# Patient Record
Sex: MALE | Race: WHITE | NOT HISPANIC OR LATINO | Employment: FULL TIME | URBAN - METROPOLITAN AREA
[De-identification: names, ages, dates, MRNs, and addresses within clinical notes are randomized per-mention and may not be internally consistent; named-entity substitution may affect disease eponyms.]

---

## 2020-05-18 ENCOUNTER — HOSPITAL ENCOUNTER (OUTPATIENT)
Dept: PREADMISSION TESTING | Facility: HOSPITAL | Age: 55
Discharge: HOME | End: 2020-05-18
Attending: SURGERY
Payer: COMMERCIAL

## 2020-05-18 ENCOUNTER — LAB VISIT (OUTPATIENT)
Dept: LAB | Facility: HOSPITAL | Age: 55
End: 2020-05-18
Attending: SURGERY
Payer: COMMERCIAL

## 2020-05-18 DIAGNOSIS — K42.9 UMBILICAL HERNIA WITHOUT OBSTRUCTION AND WITHOUT GANGRENE: ICD-10-CM

## 2020-05-18 LAB
ANION GAP SERPL CALC-SCNC: 12 MMOL/L (ref 8–16)
BASOPHILS # BLD AUTO: 0.07 K/UL (ref 0–0.2)
BASOPHILS NFR BLD: 0.5 % (ref 0–1.9)
BUN SERPL-MCNC: 16 MG/DL (ref 6–20)
CALCIUM SERPL-MCNC: 9.5 MG/DL (ref 8.7–10.5)
CHLORIDE SERPL-SCNC: 105 MMOL/L (ref 95–110)
CO2 SERPL-SCNC: 25 MMOL/L (ref 23–29)
CREAT SERPL-MCNC: 0.9 MG/DL (ref 0.5–1.4)
DIFFERENTIAL METHOD: ABNORMAL
EOSINOPHIL # BLD AUTO: 0.2 K/UL (ref 0–0.5)
EOSINOPHIL NFR BLD: 1.5 % (ref 0–8)
ERYTHROCYTE [DISTWIDTH] IN BLOOD BY AUTOMATED COUNT: 14.1 % (ref 11.5–14.5)
EST. GFR  (AFRICAN AMERICAN): >60 ML/MIN/1.73 M^2
EST. GFR  (NON AFRICAN AMERICAN): >60 ML/MIN/1.73 M^2
GLUCOSE SERPL-MCNC: 166 MG/DL (ref 70–110)
HCT VFR BLD AUTO: 43.9 % (ref 40–54)
HGB BLD-MCNC: 14.2 G/DL (ref 14–18)
IMM GRANULOCYTES # BLD AUTO: 0.04 K/UL (ref 0–0.04)
IMM GRANULOCYTES NFR BLD AUTO: 0.3 % (ref 0–0.5)
LYMPHOCYTES # BLD AUTO: 2.3 K/UL (ref 1–4.8)
LYMPHOCYTES NFR BLD: 17.6 % (ref 18–48)
MCH RBC QN AUTO: 28.7 PG (ref 27–31)
MCHC RBC AUTO-ENTMCNC: 32.3 G/DL (ref 32–36)
MCV RBC AUTO: 89 FL (ref 82–98)
MONOCYTES # BLD AUTO: 1.1 K/UL (ref 0.3–1)
MONOCYTES NFR BLD: 8.1 % (ref 4–15)
NEUTROPHILS # BLD AUTO: 9.4 K/UL (ref 1.8–7.7)
NEUTROPHILS NFR BLD: 72 % (ref 38–73)
NRBC BLD-RTO: 0 /100 WBC
PLATELET # BLD AUTO: 373 K/UL (ref 150–350)
PMV BLD AUTO: 10.2 FL (ref 9.2–12.9)
POTASSIUM SERPL-SCNC: 3.8 MMOL/L (ref 3.5–5.1)
RBC # BLD AUTO: 4.94 M/UL (ref 4.6–6.2)
SODIUM SERPL-SCNC: 142 MMOL/L (ref 136–145)
WBC # BLD AUTO: 13.07 K/UL (ref 3.9–12.7)

## 2020-05-18 PROCEDURE — 80048 BASIC METABOLIC PNL TOTAL CA: CPT

## 2020-05-18 PROCEDURE — 85025 COMPLETE CBC W/AUTO DIFF WBC: CPT

## 2020-05-18 PROCEDURE — 36415 COLL VENOUS BLD VENIPUNCTURE: CPT

## 2020-05-18 RX ORDER — AMLODIPINE BESYLATE 10 MG/1
10 TABLET ORAL DAILY
COMMUNITY

## 2020-05-18 RX ORDER — NAPROXEN SODIUM 220 MG/1
81 TABLET, FILM COATED ORAL DAILY
COMMUNITY

## 2020-05-18 RX ORDER — LISINOPRIL 40 MG/1
40 TABLET ORAL DAILY
COMMUNITY

## 2020-05-18 RX ORDER — METFORMIN HYDROCHLORIDE 500 MG/1
500 TABLET ORAL 2 TIMES DAILY WITH MEALS
COMMUNITY

## 2020-05-18 RX ORDER — ATORVASTATIN CALCIUM 40 MG/1
40 TABLET, FILM COATED ORAL DAILY
COMMUNITY

## 2020-05-18 NOTE — DISCHARGE INSTRUCTIONS
[facility]  Pre Admit Instructions    Day and Date of Procedure: Wednesday 6/3/20  Arrival time: 9am      · Call your doctor if you become ill before your surgery  · Someone will call you between 1 p.m. And 5 p.m.the workday before the procedure to give you an arrival time       - 7 a.m. To 5 p.m. Enter through Patient Registration Main Lobby  · You must have a responsible  to bring you home    Do NOT eat anything past: midnight  Ok for clear liquids until: 6am  Examples of clear liquids are: water, juice, or gatorade - NO Dairy     Please    · Do not wear makeup, jewelry, nail polish or body piercings  · Bring containers/solution for contacts, dentures, bridges - these and hearing aids will be removed before your procedure  · Do not bring cash, jewelry or valuables the day of your procedure   · No smoking at least 24 hours before your procedure  · Wear clothing that is comfortable and easy to take off and put on  · Do NOT shave for at least 5 days before your surgery    Review skin preparation handout before using. Shower with Hibiclens the Night before and morning of the procedure.                 Information about your stay (Please Review)    Before Surgery  1. Cafeteria Meals: 7am to 10am; 11am to 1:30 pm; Dinner/Supper must may be ordered between 11:00 am and 4 pm from the [facility] [employer]. Food will be available to  between 5 pm and 6 pm. The kitchen phone extension is [phone].  2. Your doctor may order and review labs, x-rays, ECG or other tests as a pre-surgery workup and will call you if there is need for follow up.  3. No smoking inside or outside the hospital on hospital grounds.  4. Wear clothing that is easy to take off and put on.  The hospital will provide you with a gown.  5. You may bring robe, slippers, nightwear, and toiletries (toothbrush, toothpaste, makeup).  6. If your doctor orders a Fleets Enema or other prep, follow package and/or doctors  orders.  7. Brush your teeth and rinse your mouth the morning of surgery, but dont swallow the water.  8. The nurse will ask questions and check your condition.  The doctor may tiarra your surgical site.  9. Compression boots may be put on your calves to reduce the risk of blood clots.  10. The doctor may order medicine to help you relax before surgery.  After Surgery  1. The nurse will check your temperature, breathing, blood pressure, heart rate, IV site, and surgery site.  2. A diet will be ordered-most start with ice chips and then advance slowly to other foods.  3. If you have IV fluids the IV pump will beep to let the nurse know that she needs to check it.  4. You may have a urinary catheter and staff may measure your oral intake and urine output.  5. Pain medication may be ordered by the doctor after surgery.  If you have a pain management device tell your caretakers not to press the button because of OVERDOSE RISK.  6. When the nurse or doctor tells you it is okay to get out of bed, ask for help until you are stable.  7. The nurse may ask you to turn, cough, and deep breathe to prevent lung problems.  You can use a pillow to hold your incision when you deep breathe or cough to reduce pain.  8. The nurse will give you discharge instructions--incision care, symptoms to report to your doctor, and your follow-up appointment when you are discharged.  You cannot drive yourself home.  Goal for Discharge from One Day Surgery  · Control pain with an oral medication  · Walk without feeling dizzy or weak  · Tolerate liquids well  · Urinate without difficulty    Things you can do to  Reduce the Risk of Infections or Complications  Wash Hands and use Waterless Hand Sanitizers  · Wash hands frequently with soap and warm water for at least 15 seconds.   · Use hand sanitizers (alcohol based) often at home and in public if hands are not visibly soiled  Take Antibiotic Exactly as Prescribed  · Do not stop antibiotics too soon;  you risk developing infection resistant to antibiotics  · Take your antibiotic even if you are feeling better and even if they upset your stomach  · Call the doctor if you cant tolerate the antibiotic or you have an allergic reaction  Stay Healthy  · Take medicines as prescribed by your doctor  · Keep your diabetes under control - diet and medication  · Get enough rest, exercise and eat a healthy diet  Keep the Wound Clean and Dry  · Wash hands before and after taking care of the incision (cut)  · Wash hands when you remove a dressing, before you touch/apply a new dressing  · Shower and clean incision with antibacterial soap and rinse well if the doctor approves  · Allow the cut to dry completely before putting on a clean dressing  · Do not touch the part of the bandage that will cover the incision  · Do not use ointments unless your doctor tells you to-can promote bacterial growth  · If ordered, put ointment directly on the dressing-do not touch the end of the tube  · Do not scrub, remove scabs, or leave a damp dressing on the incision  · Do not use peroxide or alcohol to clean the incision unless the doctor tells you to   · Do not let children, pets or anyone else contaminate the incision  Stop Smoking To Prevent Infection  · Stop smoking-Centers for Disease Control recommends 30 days before surgery  · Smokers get more infections after surgery-studies have shown 6 times the risk  · Smokers have more scarring and heal slower-open wounds get infected easier  Prevent Respiratory complications  · Stop smoking  · Turn, cough, and deep breathe even if you have some pain when you do so.  · Splint your incision with a pillow when you cough/deep breath, to help control pain.  · Do not lie in one position for long periods of time.   Prevent Blood Clots  · When you wake move your legs, flex your feet, rotate your ankles, wiggle your toes  · Get up when the doctor says its ok.  Dangle your feet from the side of the  bed  · Report symptoms-leg pain, redness/swelling, warm to touch; fever; shortness of breath, chest pain, severe upper back pain.

## 2020-06-03 ENCOUNTER — ANESTHESIA (OUTPATIENT)
Dept: SURGERY | Facility: HOSPITAL | Age: 55
End: 2020-06-03
Payer: COMMERCIAL

## 2020-06-03 ENCOUNTER — ANESTHESIA EVENT (OUTPATIENT)
Dept: SURGERY | Facility: HOSPITAL | Age: 55
End: 2020-06-03
Payer: COMMERCIAL

## 2020-06-03 ENCOUNTER — HOSPITAL ENCOUNTER (OUTPATIENT)
Facility: HOSPITAL | Age: 55
Discharge: HOME | End: 2020-06-03
Attending: SURGERY | Admitting: SURGERY
Payer: COMMERCIAL

## 2020-06-03 VITALS
HEART RATE: 84 BPM | DIASTOLIC BLOOD PRESSURE: 59 MMHG | RESPIRATION RATE: 18 BRPM | TEMPERATURE: 96 F | SYSTOLIC BLOOD PRESSURE: 121 MMHG | OXYGEN SATURATION: 93 %

## 2020-06-03 DIAGNOSIS — K42.9 UMBILICAL HERNIA: Primary | ICD-10-CM

## 2020-06-03 LAB — SARS-COV-2 RDRP RESP QL NAA+PROBE: NEGATIVE

## 2020-06-03 PROCEDURE — 36000707: Performed by: SURGERY

## 2020-06-03 PROCEDURE — U0002 COVID-19 LAB TEST NON-CDC: HCPCS

## 2020-06-03 PROCEDURE — 00830 ANES HERNIA RPR LWR ABD NOS: CPT | Mod: QZ | Performed by: NURSE ANESTHETIST, CERTIFIED REGISTERED

## 2020-06-03 PROCEDURE — 25000003 PHARM REV CODE 250

## 2020-06-03 PROCEDURE — 63600175 PHARM REV CODE 636 W HCPCS

## 2020-06-03 PROCEDURE — 25000003 PHARM REV CODE 250: Performed by: SURGERY

## 2020-06-03 PROCEDURE — C1781 MESH (IMPLANTABLE): HCPCS | Performed by: SURGERY

## 2020-06-03 PROCEDURE — 36000706: Performed by: SURGERY

## 2020-06-03 PROCEDURE — 37000009 HC ANESTHESIA EA ADD 15 MINS: Performed by: SURGERY

## 2020-06-03 PROCEDURE — 94760 N-INVAS EAR/PLS OXIMETRY 1: CPT

## 2020-06-03 PROCEDURE — 37000008 HC ANESTHESIA 1ST 15 MINUTES: Performed by: SURGERY

## 2020-06-03 PROCEDURE — 71000033 HC RECOVERY, INTIAL HOUR: Performed by: SURGERY

## 2020-06-03 DEVICE — MESH PATCH HERNIA VENTRALEX: Type: IMPLANTABLE DEVICE | Site: UMBILICAL | Status: FUNCTIONAL

## 2020-06-03 RX ORDER — ACETAMINOPHEN 10 MG/ML
INJECTION, SOLUTION INTRAVENOUS
Status: DISCONTINUED | OUTPATIENT
Start: 2020-06-03 | End: 2020-06-03

## 2020-06-03 RX ORDER — FENTANYL CITRATE 50 UG/ML
INJECTION, SOLUTION INTRAMUSCULAR; INTRAVENOUS
Status: DISCONTINUED | OUTPATIENT
Start: 2020-06-03 | End: 2020-06-03

## 2020-06-03 RX ORDER — CEFAZOLIN SODIUM 2 G/50ML
2 SOLUTION INTRAVENOUS
Status: DISCONTINUED | OUTPATIENT
Start: 2020-06-03 | End: 2020-06-03 | Stop reason: HOSPADM

## 2020-06-03 RX ORDER — HYDROMORPHONE HYDROCHLORIDE 1 MG/ML
1 INJECTION, SOLUTION INTRAMUSCULAR; INTRAVENOUS; SUBCUTANEOUS EVERY 4 HOURS PRN
Status: DISCONTINUED | OUTPATIENT
Start: 2020-06-03 | End: 2020-06-03 | Stop reason: HOSPADM

## 2020-06-03 RX ORDER — ONDANSETRON HYDROCHLORIDE 2 MG/ML
INJECTION, SOLUTION INTRAMUSCULAR; INTRAVENOUS
Status: DISCONTINUED | OUTPATIENT
Start: 2020-06-03 | End: 2020-06-03

## 2020-06-03 RX ORDER — SODIUM CHLORIDE 9 MG/ML
INJECTION, SOLUTION INTRAVENOUS CONTINUOUS
Status: DISCONTINUED | OUTPATIENT
Start: 2020-06-03 | End: 2020-06-03 | Stop reason: HOSPADM

## 2020-06-03 RX ORDER — ONDANSETRON 2 MG/ML
4 INJECTION INTRAMUSCULAR; INTRAVENOUS EVERY 12 HOURS PRN
Status: DISCONTINUED | OUTPATIENT
Start: 2020-06-03 | End: 2020-06-03 | Stop reason: HOSPADM

## 2020-06-03 RX ORDER — SODIUM CHLORIDE, SODIUM LACTATE, POTASSIUM CHLORIDE, CALCIUM CHLORIDE 600; 310; 30; 20 MG/100ML; MG/100ML; MG/100ML; MG/100ML
INJECTION, SOLUTION INTRAVENOUS CONTINUOUS PRN
Status: DISCONTINUED | OUTPATIENT
Start: 2020-06-03 | End: 2020-06-03

## 2020-06-03 RX ORDER — MIDAZOLAM HYDROCHLORIDE 1 MG/ML
INJECTION INTRAMUSCULAR; INTRAVENOUS
Status: DISCONTINUED | OUTPATIENT
Start: 2020-06-03 | End: 2020-06-03

## 2020-06-03 RX ORDER — LIDOCAINE HYDROCHLORIDE 20 MG/ML
INJECTION, SOLUTION EPIDURAL; INFILTRATION; INTRACAUDAL; PERINEURAL
Status: DISCONTINUED | OUTPATIENT
Start: 2020-06-03 | End: 2020-06-03

## 2020-06-03 RX ORDER — BUPIVACAINE HYDROCHLORIDE AND EPINEPHRINE 5; 5 MG/ML; UG/ML
INJECTION, SOLUTION EPIDURAL; INTRACAUDAL; PERINEURAL
Status: DISCONTINUED | OUTPATIENT
Start: 2020-06-03 | End: 2020-06-03 | Stop reason: HOSPADM

## 2020-06-03 RX ORDER — CEFAZOLIN SODIUM 1 G/3ML
INJECTION, POWDER, FOR SOLUTION INTRAMUSCULAR; INTRAVENOUS
Status: DISCONTINUED | OUTPATIENT
Start: 2020-06-03 | End: 2020-06-03

## 2020-06-03 RX ORDER — HYDROCODONE BITARTRATE AND ACETAMINOPHEN 5; 325 MG/1; MG/1
1 TABLET ORAL EVERY 4 HOURS PRN
Status: DISCONTINUED | OUTPATIENT
Start: 2020-06-03 | End: 2020-06-03 | Stop reason: HOSPADM

## 2020-06-03 RX ORDER — PROPOFOL 10 MG/ML
VIAL (ML) INTRAVENOUS
Status: DISCONTINUED | OUTPATIENT
Start: 2020-06-03 | End: 2020-06-03

## 2020-06-03 RX ADMIN — LIDOCAINE HYDROCHLORIDE 80 MG: 20 INJECTION, SOLUTION EPIDURAL; INFILTRATION; INTRACAUDAL; PERINEURAL at 08:06

## 2020-06-03 RX ADMIN — FENTANYL CITRATE 25 MCG: 50 INJECTION, SOLUTION INTRAMUSCULAR; INTRAVENOUS at 08:06

## 2020-06-03 RX ADMIN — FENTANYL CITRATE 50 MCG: 50 INJECTION, SOLUTION INTRAMUSCULAR; INTRAVENOUS at 09:06

## 2020-06-03 RX ADMIN — ONDANSETRON 8 MG: 2 INJECTION, SOLUTION INTRAMUSCULAR; INTRAVENOUS at 08:06

## 2020-06-03 RX ADMIN — SODIUM CHLORIDE, SODIUM LACTATE, POTASSIUM CHLORIDE, AND CALCIUM CHLORIDE: .6; .31; .03; .02 INJECTION, SOLUTION INTRAVENOUS at 08:06

## 2020-06-03 RX ADMIN — PROPOFOL 200 MG: 10 INJECTION, EMULSION INTRAVENOUS at 08:06

## 2020-06-03 RX ADMIN — CEFAZOLIN 2 G: 1 INJECTION, POWDER, FOR SOLUTION INTRAVENOUS at 08:06

## 2020-06-03 RX ADMIN — MIDAZOLAM HYDROCHLORIDE 2 MG: 1 INJECTION, SOLUTION INTRAMUSCULAR; INTRAVENOUS at 08:06

## 2020-06-03 RX ADMIN — FENTANYL CITRATE 50 MCG: 50 INJECTION, SOLUTION INTRAMUSCULAR; INTRAVENOUS at 08:06

## 2020-06-03 RX ADMIN — ACETAMINOPHEN 1000 MG: 10 INJECTION, SOLUTION INTRAVENOUS at 08:06

## 2020-06-03 NOTE — TRANSFER OF CARE
Anesthesia Transfer of Care Note    Patient: Rajinder Jett    Procedure(s) Performed: Procedure(s) (LRB):  REPAIR, HERNIA, UMBILICAL (N/A)    Patient location: PACU    Anesthesia Type: general    Transport from OR: Transported from OR on 6-10 L/min O2 by face mask with adequate spontaneous ventilation    Post pain: adequate analgesia    Post assessment: no apparent anesthetic complications and tolerated procedure well    Post vital signs: stable    Level of consciousness: sedated    Nausea/Vomiting: no nausea/vomiting    Complications: none    Transfer of care protocol was followed      Last vitals:   Visit Vitals  /66 (BP Location: Left arm, Patient Position: Lying)   Pulse 83   Temp 35.7 °C (96.3 °F) (Oral)   Resp 18   SpO2 (!) 93%

## 2020-06-03 NOTE — ANESTHESIA POSTPROCEDURE EVALUATION
Anesthesia Post Evaluation    Patient: Rajinder Jett    Procedure(s) Performed: Procedure(s) (LRB):  REPAIR, HERNIA, UMBILICAL (N/A)    Final Anesthesia Type: general    Patient location during evaluation: PACU  Patient participation: Yes- Able to Participate  Level of consciousness: awake and alert and oriented  Post-procedure vital signs: reviewed and stable  Pain management: adequate  Airway patency: patent    PONV status at discharge: No PONV  Anesthetic complications: no      Cardiovascular status: blood pressure returned to baseline and hemodynamically stable  Respiratory status: unassisted, spontaneous ventilation and room air  Hydration status: euvolemic  Follow-up not needed.          Vitals Value Taken Time   /66 6/3/2020 10:15 AM   Temp 35.7 °C (96.3 °F) 6/3/2020 10:00 AM   Pulse 83 6/3/2020 10:15 AM   Resp 18 6/3/2020 10:00 AM   SpO2 93 % 6/3/2020 11:10 AM         Event Time     Out of Recovery 09:46:25          Pain/Karishma Score: Karishma Score: 7 (6/3/2020  9:08 AM)

## 2020-06-03 NOTE — ANESTHESIA PREPROCEDURE EVALUATION
06/03/2020  Rajinder Jett is a 55 y.o., male.    Anesthesia Evaluation    I have reviewed the Patient Summary Reports.    I have reviewed the Nursing Notes.    I have reviewed the Medications.     Review of Systems  Anesthesia Hx:  No problems with previous Anesthesia    Social:  Non-Smoker, No Alcohol Use    Hematology/Oncology:  Hematology Normal   Oncology Normal     EENT/Dental:EENT/Dental Normal   Cardiovascular:   Exercise tolerance: good Hypertension, well controlled    Pulmonary:  Pulmonary Normal    Renal/:  Renal/ Normal     Hepatic/GI:  Hepatic/GI Normal    Musculoskeletal:  Musculoskeletal Normal    Neurological:  Neurology Normal    Endocrine:   Diabetes, well controlled, type 2    Dermatological:  Skin Normal    Psych:  Psychiatric Normal           Physical Exam  General:  Obesity    Airway/Jaw/Neck:  Airway Findings: Mouth Opening: Normal Tongue: Normal  General Airway Assessment: Adult  Mallampati: II  TM Distance: Normal, at least 6 cm                 Anesthesia Plan  Type of Anesthesia, risks & benefits discussed:  Anesthesia Type:  general  Patient's Preference:   Intra-op Monitoring Plan: standard ASA monitors  Intra-op Monitoring Plan Comments:   Post Op Pain Control Plan: per primary service following discharge from PACU and multimodal analgesia  Post Op Pain Control Plan Comments:   Induction:   IV  Beta Blocker:  Patient is not currently on a Beta-Blocker (No further documentation required).       Informed Consent: Patient understands risks and agrees with Anesthesia plan.  Questions answered. Anesthesia consent signed with patient.  ASA Score: 2     Day of Surgery Review of History & Physical: I have interviewed and examined the patient. I have reviewed the patient's H&P dated: 6/3/2020. There are no significant changes.  H&P update referred to the surgeon.         Ready For  Surgery From Anesthesia Perspective.

## 2020-06-03 NOTE — BRIEF OP NOTE
[facility]  Brief Operative Note    Surgery Date: 6/3/2020     Surgeon(s) and Role:     * Will Acuña MD - Primary    Assisting Surgeon: None    Pre-op Diagnosis:  Umbilical hernia without obstruction and without gangrene [K42.9]    Post-op Diagnosis:  Post-Op Diagnosis Codes:     * Umbilical hernia without obstruction and without gangrene [K42.9]    Procedure(s) (LRB):  REPAIR, HERNIA, UMBILICAL (N/A)    Anesthesia: General    Description of the findings of the procedure(s):3cm defect    Estimated Blood Loss:10cc         Specimens:   Specimen (12h ago, onward)    None            Discharge Note    OUTCOME: Patient tolerated treatment/procedure well without complication and is now ready for discharge.    DISPOSITION: Home or Self Care    FINAL DIAGNOSIS:  Umbilical hernia    FOLLOWUP: In clinic    DISCHARGE INSTRUCTIONS:    Discharge Procedure Orders   Diet general     Ice to affected area     Lifting restrictions     Call MD for:  temperature >100.4     Call MD for:  persistent nausea and vomiting     Call MD for:  severe uncontrolled pain     Call MD for:  difficulty breathing, headache or visual disturbances     Call MD for:  redness, tenderness, or signs of infection (pain, swelling, redness, odor or green/yellow discharge around incision site)     Remove dressing in 48 hours

## 2020-06-03 NOTE — OP NOTE
DATE OF PROCEDURE: 6/3/2020     PREOPERATIVE DIAGNOSES:   Umbilical hernia without obstruction and without gangrene [K42.9]    POSTOPERATIVE DIAGNOSES:   Umbilical hernia without obstruction and without gangrene [K42.9]    PROCEDURES PERFORMED:   Procedure(s) (LRB):  REPAIR, HERNIA, UMBILICAL (N/A)    Surgeon(s) and Role:     * Will Acuña MD - Primary     ANESTHESIA: general     BLOOD LOSS: Minimal.     SPECIMENS: none     FINDINGS: 3cm defect repaired with 8.6cm mesh    After consent was obtained, the abdomen was prepped and draped in the usual fashion.  I anesthetized the surgical site with .25% marcaine.  A curvilinear incision was made below the umbilicus and dissection was carried out down to the abdominal wall.  The umbilical stalk was amputated and the fascial edges were cleaned off around the defect.  The hernia sac was opened and then amputated at the fascial level.  The defect measured 3cm.  8.6 cm mesh and transfascial sutures of 0 proline was used to close the defect.  After this was done, there was no evidence of bleeding from the surgical site.  The umbilical stalk was anchored to the abdominal wall with 0 ethibond, the deeper tissues closed with 3-0 vicryl and the skin closed with 4-0 vicryl in a subcuticular fashion.  Steri strips and a pressure dressing was applied.      At the end of the case sponge, instrument, and needle counts were correct.

## 2020-06-03 NOTE — INTERVAL H&P NOTE
The patient has been examined and the H&P has been reviewed:        I concur with the findings and no changes have occurred since H&P was written.        Patient cleared for Anesthesia: General        Anesthesia/Surgery risks, benefits and alternative options discussed and understood by patient/family.      Active Hospital Problems    Diagnosis  POA    Umbilical hernia [K42.9]  Yes      Resolved Hospital Problems   No resolved problems to display.

## 2020-06-03 NOTE — NURSING
Care assumed after bedside report received from Aime MEEKS.  Patient transported to 3rd floor room 313.  Patient in no distress.  AAOx3.  No complaints of any discomforts. Bed in low position.  Side rails up x 2.  Call bell in reach.  Will continue to monitor.

## 2020-06-10 ENCOUNTER — OFFICE VISIT (OUTPATIENT)
Dept: UROLOGY | Facility: CLINIC | Age: 55
End: 2020-06-10
Attending: UROLOGY
Payer: COMMERCIAL

## 2020-06-10 VITALS — BODY MASS INDEX: 39.76 KG/M2 | RESPIRATION RATE: 16 BRPM | HEIGHT: 73 IN | WEIGHT: 300 LBS

## 2020-06-10 DIAGNOSIS — N28.1 RENAL CYST: Primary | ICD-10-CM

## 2020-06-10 PROCEDURE — 99203 PR OFFICE/OUTPT VISIT, NEW, LEVL III, 30-44 MIN: ICD-10-PCS | Mod: S$GLB,,, | Performed by: UROLOGY

## 2020-06-10 PROCEDURE — 99999 PR PBB SHADOW E&M-EST. PATIENT-LVL III: ICD-10-PCS | Mod: PBBFAC,,, | Performed by: UROLOGY

## 2020-06-10 PROCEDURE — 99999 PR PBB SHADOW E&M-EST. PATIENT-LVL III: CPT | Mod: PBBFAC,,, | Performed by: UROLOGY

## 2020-06-10 PROCEDURE — 3008F BODY MASS INDEX DOCD: CPT | Mod: CPTII,S$GLB,, | Performed by: UROLOGY

## 2020-06-10 PROCEDURE — 3008F PR BODY MASS INDEX (BMI) DOCUMENTED: ICD-10-PCS | Mod: CPTII,S$GLB,, | Performed by: UROLOGY

## 2020-06-10 PROCEDURE — 99203 OFFICE O/P NEW LOW 30 MIN: CPT | Mod: S$GLB,,, | Performed by: UROLOGY

## 2020-06-10 NOTE — PROGRESS NOTES
Subjective:       Patient ID: Rajinder Jett is a 55 y.o. male.    Chief Complaint: renal cyst (ultrasound)    HPI chief complaint right renal cyst  Patient is a consult for drop from Dr. Parkinson with a right lower pole benign cyst.  It measures 3.2 x 2.9 cm.  There is no internal septations.  There is no evidence of renal masses.  Patient states he had a PSA done by Dr. Parkinson which was normal but I do not have those records.  He is happy with his voiding    Past Medical History:   Diagnosis Date    Diabetes mellitus     Hypertension        Past Surgical History:   Procedure Laterality Date    UMBILICAL HERNIA REPAIR N/A 6/3/2020    Procedure: REPAIR, HERNIA, UMBILICAL;  Surgeon: Will Acuña MD;  Location: [facility] OR;  Service: General;  Laterality: N/A;       Family History   Problem Relation Age of Onset    Stroke Mother     Liver disease Father        Social History     Socioeconomic History    Marital status:      Spouse name: Not on file    Number of children: Not on file    Years of education: Not on file    Highest education level: Not on file   Occupational History    Not on file   Social Needs    Financial resource strain: Not on file    Food insecurity:     Worry: Not on file     Inability: Not on file    Transportation needs:     Medical: Not on file     Non-medical: Not on file   Tobacco Use    Smoking status: Never Smoker    Smokeless tobacco: Never Used   Substance and Sexual Activity    Alcohol use: Yes     Comment: social    Drug use: Never    Sexual activity: Not on file   Lifestyle    Physical activity:     Days per week: Not on file     Minutes per session: Not on file    Stress: Not on file   Relationships    Social connections:     Talks on phone: Not on file     Gets together: Not on file     Attends Gnosticism service: Not on file     Active member of club or organization: Not on file     Attends meetings of clubs or organizations: Not on file      Relationship status: Not on file   Other Topics Concern    Not on file   Social History Narrative    Not on file       Allergies:  Patient has no known allergies.    Medications:    Current Outpatient Medications:     amLODIPine (NORVASC) 10 MG tablet, Take 10 mg by mouth once daily., Disp: , Rfl:     aspirin 81 MG Chew, Take 81 mg by mouth once daily., Disp: , Rfl:     atorvastatin (LIPITOR) 40 MG tablet, Take 40 mg by mouth once daily., Disp: , Rfl:     lisinopriL (PRINIVIL,ZESTRIL) 40 MG tablet, Take 40 mg by mouth once daily., Disp: , Rfl:     metFORMIN (GLUCOPHAGE) 500 MG tablet, Take 500 mg by mouth 2 (two) times daily with meals., Disp: , Rfl:     Review of Systems   Constitutional: Negative for activity change, appetite change, chills, diaphoresis, fatigue, fever and unexpected weight change.   HENT: Negative for congestion, dental problem, hearing loss, mouth sores, postnasal drip, rhinorrhea, sinus pressure and trouble swallowing.    Eyes: Negative for pain, discharge and itching.   Respiratory: Negative for apnea, cough, choking, chest tightness, shortness of breath and wheezing.    Cardiovascular: Negative for chest pain, palpitations and leg swelling.   Gastrointestinal: Negative for abdominal distention, abdominal pain, anal bleeding, blood in stool, constipation, diarrhea, nausea, rectal pain and vomiting.   Endocrine: Negative for polydipsia and polyuria.   Genitourinary: Negative for decreased urine volume, difficulty urinating, discharge, dysuria, enuresis, flank pain, frequency, genital sores, hematuria, penile pain, penile swelling, scrotal swelling, testicular pain and urgency.   Musculoskeletal: Negative for arthralgias, back pain and myalgias.   Skin: Negative for color change, rash and wound.   Neurological: Negative for dizziness, syncope, speech difficulty, light-headedness and headaches.   Hematological: Negative for adenopathy. Does not bruise/bleed easily.    Psychiatric/Behavioral: Negative for behavioral problems, confusion, hallucinations and sleep disturbance.       Objective:      Physical Exam   Constitutional: He appears well-developed.   HENT:   Head: Normocephalic.   Cardiovascular: Normal rate.    Pulmonary/Chest: Effort normal.   Abdominal: Soft.   Genitourinary: Prostate normal.   Genitourinary Comments: 25-30 g benign   Neurological: He is alert.   Skin: Skin is warm.     Psychiatric: He has a normal mood and affect.       Assessment:       1. Renal cyst        Plan:       Rajinder was seen today for renal cyst.    Diagnoses and all orders for this visit:    Renal cyst     Benign right renal cyst  No other treatment is necessary  Return to clinic p.r.n.

## 2020-06-10 NOTE — LETTER
Mabel 10, 2020      Ghislaine Potts, NP  [address] [facility]  [place] [address]           [facility]  [address] [facility] [address]  Phone: [phone]          Patient: Rajinder Jett   MR Number: [id number]   YOB: 1965   Date of Visit: 6/10/2020       Dear Ghislaine Potts:    Thank you for referring Rajinder Jett to me for evaluation. Attached you will find relevant portions of my assessment and plan of care.    If you have questions, please do not hesitate to call me. I look forward to following Rajinder Jett along with you.    Sincerely,    Ty Gonsales Jr., MD    Enclosure  CC:  No Recipients    If you would like to receive this communication electronically, please contact [email or web address] or [phone] to request more information on [employer] Link access.    For providers and/or their staff who would like to refer a patient to [facility], please contact us through our one-stop-shop provider referral line, [facility], at [phone].    If you feel you have received this communication in error or would no longer like to receive these types of communications, please e-mail [email or web address]

## 2022-04-05 ENCOUNTER — OFFICE VISIT (OUTPATIENT)
Dept: URGENT CARE | Facility: CLINIC | Age: 57
End: 2022-04-05
Payer: COMMERCIAL

## 2022-04-05 VITALS
OXYGEN SATURATION: 95 % | WEIGHT: 300 LBS | RESPIRATION RATE: 18 BRPM | SYSTOLIC BLOOD PRESSURE: 137 MMHG | HEIGHT: 73 IN | TEMPERATURE: 98 F | DIASTOLIC BLOOD PRESSURE: 87 MMHG | HEART RATE: 94 BPM | BODY MASS INDEX: 39.76 KG/M2

## 2022-04-05 DIAGNOSIS — L23.7 CONTACT DERMATITIS DUE TO POISON IVY: Primary | ICD-10-CM

## 2022-04-05 PROCEDURE — 3075F PR MOST RECENT SYSTOLIC BLOOD PRESS GE 130-139MM HG: ICD-10-PCS | Mod: CPTII,S$GLB,, | Performed by: PHYSICIAN ASSISTANT

## 2022-04-05 PROCEDURE — 4010F PR ACE/ARB THEARPY RXD/TAKEN: ICD-10-PCS | Mod: CPTII,S$GLB,, | Performed by: PHYSICIAN ASSISTANT

## 2022-04-05 PROCEDURE — 1160F PR REVIEW ALL MEDS BY PRESCRIBER/CLIN PHARMACIST DOCUMENTED: ICD-10-PCS | Mod: CPTII,S$GLB,, | Performed by: PHYSICIAN ASSISTANT

## 2022-04-05 PROCEDURE — 1160F RVW MEDS BY RX/DR IN RCRD: CPT | Mod: CPTII,S$GLB,, | Performed by: PHYSICIAN ASSISTANT

## 2022-04-05 PROCEDURE — 3079F PR MOST RECENT DIASTOLIC BLOOD PRESSURE 80-89 MM HG: ICD-10-PCS | Mod: CPTII,S$GLB,, | Performed by: PHYSICIAN ASSISTANT

## 2022-04-05 PROCEDURE — 3079F DIAST BP 80-89 MM HG: CPT | Mod: CPTII,S$GLB,, | Performed by: PHYSICIAN ASSISTANT

## 2022-04-05 PROCEDURE — 3008F BODY MASS INDEX DOCD: CPT | Mod: CPTII,S$GLB,, | Performed by: PHYSICIAN ASSISTANT

## 2022-04-05 PROCEDURE — 1159F PR MEDICATION LIST DOCUMENTED IN MEDICAL RECORD: ICD-10-PCS | Mod: CPTII,S$GLB,, | Performed by: PHYSICIAN ASSISTANT

## 2022-04-05 PROCEDURE — 1159F MED LIST DOCD IN RCRD: CPT | Mod: CPTII,S$GLB,, | Performed by: PHYSICIAN ASSISTANT

## 2022-04-05 PROCEDURE — 99203 OFFICE O/P NEW LOW 30 MIN: CPT | Mod: S$GLB,,, | Performed by: PHYSICIAN ASSISTANT

## 2022-04-05 PROCEDURE — 3075F SYST BP GE 130 - 139MM HG: CPT | Mod: CPTII,S$GLB,, | Performed by: PHYSICIAN ASSISTANT

## 2022-04-05 PROCEDURE — 3008F PR BODY MASS INDEX (BMI) DOCUMENTED: ICD-10-PCS | Mod: CPTII,S$GLB,, | Performed by: PHYSICIAN ASSISTANT

## 2022-04-05 PROCEDURE — 99203 PR OFFICE/OUTPT VISIT, NEW, LEVL III, 30-44 MIN: ICD-10-PCS | Mod: S$GLB,,, | Performed by: PHYSICIAN ASSISTANT

## 2022-04-05 PROCEDURE — 4010F ACE/ARB THERAPY RXD/TAKEN: CPT | Mod: CPTII,S$GLB,, | Performed by: PHYSICIAN ASSISTANT

## 2022-04-05 RX ORDER — PREDNISONE 10 MG/1
TABLET ORAL
Qty: 35 TABLET | Refills: 0 | Status: SHIPPED | OUTPATIENT
Start: 2022-04-05 | End: 2022-04-20

## 2022-04-05 NOTE — PROGRESS NOTES
"Subjective:       Patient ID: Rajinder Jett is a 57 y.o. male.    Vitals:  height is 6' 1" (1.854 m) and weight is 136.1 kg (300 lb). His temperature is 97.5 °F (36.4 °C). His blood pressure is 137/87 and his pulse is 94. His respiration is 18 and oxygen saturation is 95%.     Chief Complaint: Poison Ivy    Pt says he came in contact with poison ivy about five days ago(3/31). Pt says it started on his arms and spread to his stomach, sides, and lower back. Pt says he was doing some yard work at home and came across it.Pt says theres no pain more itching and dryness, theres also some redness and peeling. Pt took benadryl and was using anti-ich cream, but with a mild relief.       Poison Ivy  This is a new problem. The current episode started in the past 7 days. The problem has been gradually worsening since onset. The affected locations include the left arm, right arm, back and abdomen. The rash is characterized by dryness, redness, itchiness and peeling. He was exposed to plant contact. Pertinent negatives include no congestion, fever or shortness of breath. Past treatments include anti-itch cream. The treatment provided mild relief. There is no history of eczema.       Constitution: Negative for chills and fever.   HENT: Negative for congestion.    Cardiovascular: Negative for chest pain.   Respiratory: Negative for shortness of breath.    Musculoskeletal: Negative for pain, joint pain and back pain.   Skin: Positive for color change and rash.   Neurological: Negative for dizziness, headaches, numbness and tingling.   Psychiatric/Behavioral: Negative for nervous/anxious. The patient is not nervous/anxious.        Objective:      Physical Exam   Constitutional: He is oriented to person, place, and time. He appears well-developed. He is cooperative.  Non-toxic appearance. He does not appear ill. No distress.   HENT:   Head: Normocephalic and atraumatic.   Ears:   Right Ear: Hearing normal.   Left Ear: Hearing " normal.   Eyes: Conjunctivae and lids are normal. No scleral icterus.   Neck: Trachea normal and phonation normal. Neck supple. No edema present. No erythema present. No neck rigidity present.   Cardiovascular: Normal rate, regular rhythm, normal heart sounds and normal pulses.   Pulmonary/Chest: Effort normal and breath sounds normal. No respiratory distress. He has no decreased breath sounds. He has no rhonchi.   Abdominal: Normal appearance.   Musculoskeletal: Normal range of motion.         General: No tenderness or deformity. Normal range of motion.   Neurological: He is alert and oriented to person, place, and time. He exhibits normal muscle tone. Coordination normal.   Skin: Skin is warm, dry, intact, not diaphoretic, not pale, rash (to bilateral arms, thrunk, and bilateral feet) and maculopapular.   Psychiatric: His speech is normal and behavior is normal. Judgment and thought content normal.   Nursing note and vitals reviewed.        Assessment:       1. Contact dermatitis due to poison ivy          Plan:         Contact dermatitis due to poison ivy  -     predniSONE (DELTASONE) 10 MG tablet; Take 4 tablets (40 mg total) by mouth once daily for 5 days, THEN 2 tablets (20 mg total) once daily for 5 days, THEN 1 tablet (10 mg total) once daily for 5 days.  Dispense: 35 tablet; Refill: 0           Medical Decision Making:   Urgent Care Management:   Patient presents with a poison ivy dermatitis that has been going on for the past few days.  He has tried over-the-counter remedies with no significant relief of his symptoms.  It is on his arms, feet, and front back of his trunk.  Feel at this point patient requires oral treatment with prednisone.  Patient is a diabetic and he was warned on checking his sugars as the prednisone can elevate his blood sugar.  He should discontinue if his sugars elevate significantly.  Patient voices understanding and is in agreement with this plan.       Patient Instructions   -  Rest.  - Drink plenty of fluids.  - Take Tylenol and/or Ibuprofen as directed as needed for fever/pain.  Do not take more than the recommended dose.  - follow up with your PCP within the next 1-2 weeks as needed.   - Take over-the-counter claritin, zyrtec, allegra, or xyzal as directed.  You should NOT use a decongestant form (D) of this medication if you have a history of hypertension or heart disease.   - Take over the counter Benadryl as directed as needed for itching/swelling.    - You can use over the counter Zanfel or Tecnu as directed for Poison Ivy.  - You must understand that you have received an Urgent Care treatment only and that you may be released before all of your medical problems are known or treated.   - You, the patient, will arrange for follow up care as instructed.   - If your condition worsens or fails to improve we recommend that you receive another evaluation at the ER immediately or contact your PCP to discuss your concerns.   - You can call [phone] or [phone] to help schedule an appointment with the appropriate provider.

## 2022-04-05 NOTE — PATIENT INSTRUCTIONS
- Rest.  - Drink plenty of fluids.  - Take Tylenol and/or Ibuprofen as directed as needed for fever/pain.  Do not take more than the recommended dose.  - follow up with your PCP within the next 1-2 weeks as needed.   - Take over-the-counter claritin, zyrtec, allegra, or xyzal as directed.  You should NOT use a decongestant form (D) of this medication if you have a history of hypertension or heart disease.   - Take over the counter Benadryl as directed as needed for itching/swelling.    - You can use over the counter Zanfel or Tecnu as directed for Poison Ivy.  - You must understand that you have received an Urgent Care treatment only and that you may be released before all of your medical problems are known or treated.   - You, the patient, will arrange for follow up care as instructed.   - If your condition worsens or fails to improve we recommend that you receive another evaluation at the ER immediately or contact your PCP to discuss your concerns.   - You can call [phone] or [phone] to help schedule an appointment with the appropriate provider.

## (undated) DEVICE — SYR 10CC LUER LOCK

## (undated) DEVICE — GLOVE PROTEXIS LTX MICRO  7.5

## (undated) DEVICE — DRESSING TELFA N ADH 3X8

## (undated) DEVICE — SUT CTD VICRYL 0 UND BR CT

## (undated) DEVICE — NDL ECLIPSE SAFETY 18GX1-1/2IN

## (undated) DEVICE — ADHESIVE MASTISOL VIAL 48/BX

## (undated) DEVICE — PACK GENERAL SURGERY

## (undated) DEVICE — DRAPE MINOR PROCEDURE

## (undated) DEVICE — CLOSURE SKIN STERI STRIP 1/2X4

## (undated) DEVICE — SUT 2-0 VICRYL / SH (J417)

## (undated) DEVICE — SUT PROLENE 1 CTX 30IN BLUE

## (undated) DEVICE — SOL NACL IRR 1000ML BTL

## (undated) DEVICE — APPLICATOR CHLORAPREP ORN 26ML